# Patient Record
Sex: FEMALE | Race: WHITE | Employment: PART TIME | URBAN - METROPOLITAN AREA
[De-identification: names, ages, dates, MRNs, and addresses within clinical notes are randomized per-mention and may not be internally consistent; named-entity substitution may affect disease eponyms.]

---

## 2017-08-29 ENCOUNTER — HOSPITAL ENCOUNTER (OUTPATIENT)
Dept: MAMMOGRAPHY | Age: 62
Discharge: HOME | End: 2017-08-29
Payer: COMMERCIAL

## 2017-08-29 DIAGNOSIS — Z12.31 VISIT FOR SCREENING MAMMOGRAM: ICD-10-CM

## 2017-08-29 DIAGNOSIS — M85.80 OSTEOPENIA, UNSPECIFIED LOCATION: ICD-10-CM

## 2017-08-29 PROCEDURE — 77063 BREAST TOMOSYNTHESIS BI: CPT

## 2017-08-29 PROCEDURE — 77080 DXA BONE DENSITY AXIAL: CPT

## 2019-02-18 ENCOUNTER — HOSPITAL ENCOUNTER (OUTPATIENT)
Dept: MAMMOGRAPHY | Age: 64
Discharge: HOME | End: 2019-02-20
Payer: COMMERCIAL

## 2019-02-18 DIAGNOSIS — Z12.31 VISIT FOR SCREENING MAMMOGRAM: ICD-10-CM

## 2019-02-18 PROCEDURE — 77063 BREAST TOMOSYNTHESIS BI: CPT

## 2022-06-23 ENCOUNTER — OFFICE VISIT (OUTPATIENT)
Dept: FAMILY MEDICINE CLINIC | Age: 67
End: 2022-06-23
Payer: COMMERCIAL

## 2022-06-23 ENCOUNTER — TELEPHONE (OUTPATIENT)
Dept: PRIMARY CARE CLINIC | Age: 67
End: 2022-06-23

## 2022-06-23 VITALS
RESPIRATION RATE: 16 BRPM | WEIGHT: 184 LBS | OXYGEN SATURATION: 98 % | SYSTOLIC BLOOD PRESSURE: 136 MMHG | DIASTOLIC BLOOD PRESSURE: 84 MMHG | HEART RATE: 80 BPM

## 2022-06-23 DIAGNOSIS — M54.32 SCIATICA OF LEFT SIDE: Primary | ICD-10-CM

## 2022-06-23 PROCEDURE — 99213 OFFICE O/P EST LOW 20 MIN: CPT | Performed by: REGISTERED NURSE

## 2022-06-23 PROCEDURE — 96372 THER/PROPH/DIAG INJ SC/IM: CPT | Performed by: REGISTERED NURSE

## 2022-06-23 RX ORDER — KETOROLAC TROMETHAMINE 30 MG/ML
30 INJECTION, SOLUTION INTRAMUSCULAR; INTRAVENOUS ONCE
Status: COMPLETED | OUTPATIENT
Start: 2022-06-23 | End: 2022-06-23

## 2022-06-23 RX ORDER — CYCLOBENZAPRINE HCL 5 MG
5 TABLET ORAL 2 TIMES DAILY PRN
Qty: 10 TABLET | Refills: 0 | Status: SHIPPED | OUTPATIENT
Start: 2022-06-23 | End: 2022-07-03

## 2022-06-23 RX ADMIN — KETOROLAC TROMETHAMINE 30 MG: 30 INJECTION, SOLUTION INTRAMUSCULAR; INTRAVENOUS at 15:00

## 2022-06-23 SDOH — ECONOMIC STABILITY: FOOD INSECURITY: WITHIN THE PAST 12 MONTHS, YOU WORRIED THAT YOUR FOOD WOULD RUN OUT BEFORE YOU GOT MONEY TO BUY MORE.: NEVER TRUE

## 2022-06-23 SDOH — ECONOMIC STABILITY: FOOD INSECURITY: WITHIN THE PAST 12 MONTHS, THE FOOD YOU BOUGHT JUST DIDN'T LAST AND YOU DIDN'T HAVE MONEY TO GET MORE.: NEVER TRUE

## 2022-06-23 ASSESSMENT — ENCOUNTER SYMPTOMS
RESPIRATORY NEGATIVE: 1
BACK PAIN: 0
GASTROINTESTINAL NEGATIVE: 1
SHORTNESS OF BREATH: 0
EYES NEGATIVE: 1

## 2022-06-23 ASSESSMENT — PATIENT HEALTH QUESTIONNAIRE - PHQ9
SUM OF ALL RESPONSES TO PHQ9 QUESTIONS 1 & 2: 0
2. FEELING DOWN, DEPRESSED OR HOPELESS: 0
SUM OF ALL RESPONSES TO PHQ QUESTIONS 1-9: 0
1. LITTLE INTEREST OR PLEASURE IN DOING THINGS: 0

## 2022-06-23 ASSESSMENT — SOCIAL DETERMINANTS OF HEALTH (SDOH): HOW HARD IS IT FOR YOU TO PAY FOR THE VERY BASICS LIKE FOOD, HOUSING, MEDICAL CARE, AND HEATING?: NOT HARD AT ALL

## 2022-06-23 NOTE — PATIENT INSTRUCTIONS
Patient Education        Sciatica: Exercises  Introduction  Here are some examples of typical rehabilitation exercises for your condition. Start each exercise slowly. Ease off the exercise if you start to have pain. Your doctor or physical therapist will tell you when you can start theseexercises and which ones will work best for you. When you are not being active, find a comfortable position for rest. Some people are comfortable on the floor or a medium-firm bed with a small pillow under their head and another under their knees. Some people prefer to lie on their side with a pillow between their knees. Don't stay in one position fortoo long. Take short walks (10 to 20 minutes) every 2 to 3 hours. Avoid slopes, hills, and stairs until you feel better. Walk only distances you can manage withoutpain, especially leg pain. How to do the exercises  Back stretches    1. Get down on your hands and knees on the floor. 2. Relax your head and allow it to droop. Round your back up toward the ceiling until you feel a nice stretch in your upper, middle, and lower back. Hold this stretch for as long as it feels comfortable, or about 15 to 30 seconds. 3. Return to the starting position with a flat back while you are on your hands and knees. 4. Let your back sway by pressing your stomach toward the floor. Lift your buttocks toward the ceiling. 5. Hold this position for 15 to 30 seconds. 6. Repeat 2 to 4 times. Follow-up care is a key part of your treatment and safety. Be sure to make and go to all appointments, and call your doctor if you are having problems. It's also a good idea to know your test results and keep alist of the medicines you take. Where can you learn more? Go to https://[employer] Servicesserenity.Mind on Games. org and sign in to your [email or web address] account. Enter L280 in the arGEN-X box to learn more about \"Sciatica: Exercises. \"     If you do not have an account, please click on the \"Sign Up Now\" link.  Current as of: March 9, 2022               Content Version: 13.3  © 2006-2022 [employer]. Care instructions adapted under license by [facility] ([facility]). If you have questions about a medical condition or this instruction, always ask your healthcare professional. [address] any warranty or liability for your use of this information. Patient Education        Sciatica: Care Instructions  Overview     Sciatica (say \"lyf-HH-oq-kuh\") is an irritation of one of the sciatic nerves, which come from the spinal cord in the lower back. The sciatic nerves and their branches extend down through the buttock to the foot. Sciatica can develop when an injured disc or arthritis in the back irritates or presses against a spinal nerve root. Its main symptom is pain, numbness, or weakness that is often worsein the leg or foot than in the back. Sciatica often will improve and go away with time. Early treatment usuallyincludes medicines and exercises to relieve pain. Follow-up care is a key part of your treatment and safety. Be sure to make and go to all appointments, and call your doctor if you are having problems. It's also a good idea to know your test results and keep alist of the medicines you take. How can you care for yourself at home?  Take pain medicines exactly as directed. ? If the doctor gave you a prescription medicine for pain, take it as prescribed. ? If you are not taking a prescription pain medicine, ask your doctor if you can take an over-the-counter medicine.  Use heat or ice to relieve pain. ? To apply heat, put a warm water bottle, heating pad set on low, or warm cloth on your back. Do not go to sleep with a heating pad on your skin. ? To use ice, put ice or a cold pack on the area for 10 to 20 minutes at a time. Put a thin cloth between the ice and your skin.  Avoid sitting if possible, unless it feels better than standing.    Alternate lying down with short walks. Increase your walking distance as you are able to without making your symptoms worse.  Do not do anything that makes your symptoms worse. When should you call for help? Call 911 anytime you think you may need emergency care. For example, call if:     You are unable to move a leg at all. Call your doctor now or seek immediate medical care if:     You have new or worse symptoms in your legs or buttocks. Symptoms may include:  ? Numbness or tingling. ? Weakness. ? Pain.      You lose bladder or bowel control. Watch closely for changes in your health, and be sure to contact your doctor if:     You are not getting better as expected. Where can you learn more? Go to https://[employer].Sqeeqee. org and sign in to your Videdressing account. Enter [phone] in the [employer] box to learn more about \"Sciatica: Care Instructions. \"     If you do not have an account, please click on the \"Sign Up Now\" link. Current as of: March 9, 2022               Content Version: 13.3  © 2006-2022 [employer]. Care instructions adapted under license by [facility] ([facility]). If you have questions about a medical condition or this instruction, always ask your healthcare professional. [place] [address] any warranty or liability for your use of this information.

## 2022-06-23 NOTE — TELEPHONE ENCOUNTER
Pharmacy called, Flexeril sent to [place] and CNP's cannot sent controlled meds over line. Dr Jason Eugene approved medication, writer gave verbal okay over phone.

## 2022-06-23 NOTE — PROGRESS NOTES
[address] [facility]  [address] [place] [address]  Dept: [phone]  Dept Fax: [phone]    Nurys Lees is a 61 y.o. female who presents today for her medical conditions/complaints of   Chief Complaint   Patient presents with    Leg Pain     left x 1.5 weeks          HPI:     /84   Pulse 80   Resp 16   Wt 184 lb (83.5 kg)   SpO2 98%       Leg Pain   Incident onset: Couple of weeks  There was no injury mechanism. The pain is present in the left leg (Pain in the left buttocks area that radiates down the posterior left leg ). The quality of the pain is described as aching. The pain is at a severity of 7/10. The pain has been constant since onset. Pertinent negatives include no inability to bear weight, loss of motion, loss of sensation, muscle weakness, numbness or tingling. Associated symptoms comments: Denies back pain. Denies any loss of bowel or bladder control. No perineal anesthesia. Patient endorses left leg having \"muscle spasms\" intermittently. . She reports no foreign bodies present. Exacerbated by: Sitting  She has tried nothing for the symptoms. No past medical history on file. No past surgical history on file. No family history on file. Social History     Tobacco Use    Smoking status: Not on file    Smokeless tobacco: Not on file   Substance Use Topics    Alcohol use: Not on file        Prior to Visit Medications    Medication Sig Taking? Authorizing Provider   cyclobenzaprine (FLEXERIL) 5 MG tablet Take 1 tablet by mouth 2 times daily as needed for Muscle spasms Yes Annetta Caraballo, APRN - CNP       No Known Allergies      Subjective:      Review of Systems   Constitutional: Negative. HENT: Negative. Eyes: Negative. Respiratory: Negative. Negative for shortness of breath. Cardiovascular: Negative. Negative for chest pain. Gastrointestinal: Negative. Genitourinary: Negative. Negative for difficulty urinating. Musculoskeletal: Negative for back pain, joint swelling, neck pain and neck stiffness. Left sided buttocks pain that radiates down the left leg    Skin: Negative. Neurological: Negative for tingling and numbness. Psychiatric/Behavioral: Negative. Objective:     Physical Exam  Vitals reviewed. Constitutional:       General: She is not in acute distress. Appearance: Normal appearance. She is normal weight. She is not ill-appearing or toxic-appearing. HENT:      Head: Normocephalic. Cardiovascular:      Rate and Rhythm: Normal rate and regular rhythm. Pulses: Normal pulses. Heart sounds: Normal heart sounds. Pulmonary:      Effort: Pulmonary effort is normal.      Breath sounds: Normal breath sounds. Musculoskeletal:         General: No swelling or tenderness. Cervical back: Normal.      Thoracic back: Normal.      Lumbar back: No swelling, edema, deformity, lacerations, spasms, tenderness or bony tenderness. Normal range of motion. Positive left straight leg raise test.      Right lower leg: No swelling. No edema. Left lower leg: No swelling. No edema. Skin:     General: Skin is warm. Capillary Refill: Capillary refill takes less than 2 seconds. Findings: No erythema or rash. Neurological:      General: No focal deficit present. Mental Status: She is alert. Psychiatric:         Mood and Affect: Mood normal.           MEDICAL DECISION MAKING Assessment/Plan:     Susen Galeazzi was seen today for leg pain. Diagnoses and all orders for this visit:    Sciatica of left side  -     ketorolac (TORADOL) injection 30 mg  -     cyclobenzaprine (FLEXERIL) 5 MG tablet; Take 1 tablet by mouth 2 times daily as needed for Muscle spasms    Will treat as sciatica at this time. No edema in the lower extremities, no skin discoloration or tenderness on palpation.   No red flag symptoms including any numbness, tingling, fevers, loss of sensation in the saddle or portillo-rectal area, loss of bowel or bladder function reported. No reported trauma. Advised she take the Flexeril as needed for muscle spasms, and to not drive or take any other sedating substance when taking the flexril. IM injection of toradol administered in the office for pain. Advised she use Tylenol for pain for the rest of the day as directed. May then alternate NSAIDs with tylenol on the following days. Stretches provided in AVS to do at home. Explained she should be seen emergently if any of the red flag symptoms listed above were to occur. Patient verbalized understanding and denies further questions.       Administrations This Visit     ketorolac (TORADOL) injection 30 mg     Admin Date  06/23/2022  15:00 Action  Given Dose  30 mg Route  IntraVENous Site  Deltoid Left Administered By  Jana Bloom MA    Ordering Provider: PRANAV Dennison CNP    NDC: 1234-7658-98    Lot#: [id number]    : Geetha John    Patient Supplied?: No    Comments: patient tolerated well              Results for orders placed or performed during the hospital encounter of 11/06/15   CBC Auto Differential   Result Value Ref Range    WBC 6.4 3.5 - 11.0 k/uL    RBC 4.76 4.0 - 5.2 m/uL    Hemoglobin 14.7 12.0 - 16.0 g/dL    Hematocrit 43.8 36 - 46 %    MCV 92.1 80 - 100 fL    MCH 30.8 26 - 34 pg    MCHC 33.4 31 - 37 g/dL    RDW 12.9 12.5 - 15.4 %    Platelets 636 911 - 618 k/uL    MPV 8.1 6.0 - 12.0 fL    Differential Type NOT REPORTED     Seg Neutrophils 55 36 - 66 %    Lymphocytes 35 24 - 44 %    Monocytes 8 2 - 11 %    Eosinophils % 2 1 - 4 %    Basophils 0 0 - 2 %    Segs Absolute 3.50 1.8 - 7.7 k/uL    Absolute Lymph # 2.30 1.0 - 4.8 k/uL    Absolute Mono # 0.50 0.1 - 1.2 k/uL    Absolute Eos # 0.10 0.0 - 0.4 k/uL    Basophils Absolute 0.00 0.0 - 0.2 k/uL    WBC Morphology NOT REPORTED     RBC Morphology NOT REPORTED     Platelet Estimate NOT REPORTED    Comprehensive Metabolic Panel   Result Value Ref Range    Glucose 93 70 - 99 mg/dL    BUN 19 6 - 20 mg/dL    CREATININE 0.66 0.50 - 0.90 mg/dL    Bun/Cre Ratio NOT REPORTED 9 - 20    Calcium 9.4 8.6 - 10.4 mg/dL    Sodium 143 135 - 144 mmol/L    Potassium 4.4 3.7 - 5.3 mmol/L    Chloride 106 98 - 107 mmol/L    CO2 25 20 - 31 mmol/L    Anion Gap 12 9 - 17 mmol/L    Alkaline Phosphatase 91 35 - 104 U/L    ALT 21 5 - 33 U/L    AST 17 <32 U/L    Total Bilirubin 0.42 0.3 - 1.2 mg/dL    Total Protein 7.2 6.4 - 8.3 g/dL    Albumin 4.1 3.5 - 5.2 g/dL    Albumin/Globulin Ratio 1.3 1.0 - 2.5    GFR Non-African American >60 >60 mL/min    GFR African American >60 >60 mL/min    GFR Comment          GFR Staging NOT REPORTED    T3, Free   Result Value Ref Range    T3, Free 3.32 2.02 - 4.43 pg/mL   T4, free   Result Value Ref Range    Thyroxine, Free 1.18 0.93 - 1.70 ng/dL   Hemoglobin A1C   Result Value Ref Range    Hemoglobin A1C 5.1 4.0 - 6.0 %    Estimated Avg Glucose 100 mg/dL   Lipid Panel   Result Value Ref Range    Cholesterol 208 (H) <200 mg/dL    HDL 65 >40 mg/dL    LDL Cholesterol 129 0 - 130 mg/dL    Chol/HDL Ratio 3.2 <5    Triglycerides 71 <150 mg/dL    VLDL NOT REPORTED 1 - 30 mg/dL   Rheumatoid Factor   Result Value Ref Range    Rheumatoid Factor <10 <14 IU/mL   TSH without Reflex   Result Value Ref Range    TSH 1.35 0.30 - 5.00 mIU/L       Patient counseled:     Patient given educational materials - see patientinstructions. Discussed use, benefit, and side effects of prescribed medications. All patient questions answered. Pt verbalized understanding. Instructed to continue current medications, diet and exercise. Patient agreed with treatment plan. Follow up as directed.      Electronically signed by PRANAV Chacon CNP on 6/23/2022 at 4:15 PM